# Patient Record
(demographics unavailable — no encounter records)

---

## 2023-06-07 RX ORDER — APREPITANT 125 MG/1
125 CAPSULE ORAL ONCE
Qty: 1 CAPSULE | Refills: 0 | COMMUNITY
Start: 2023-06-07 | End: 2023-06-07

## 2023-06-07 NOTE — PROGRESS NOTES
Emend Rx'd for continuing nausea. Patient advised per our Coordinator to stop aldactone as a diuretic will worsen his feelings of dehydration and nausea.       Elizabeth Doan MD, FACS, [facility]